# Patient Record
Sex: FEMALE | Race: WHITE | URBAN - METROPOLITAN AREA
[De-identification: names, ages, dates, MRNs, and addresses within clinical notes are randomized per-mention and may not be internally consistent; named-entity substitution may affect disease eponyms.]

---

## 2018-01-01 ENCOUNTER — TRANSFERRED RECORDS (OUTPATIENT)
Dept: HEALTH INFORMATION MANAGEMENT | Facility: CLINIC | Age: 78
End: 2018-01-01

## 2018-01-01 ENCOUNTER — MEDICAL CORRESPONDENCE (OUTPATIENT)
Dept: HEALTH INFORMATION MANAGEMENT | Facility: CLINIC | Age: 78
End: 2018-01-01

## 2018-01-01 ENCOUNTER — PATIENT OUTREACH (OUTPATIENT)
Dept: GASTROENTEROLOGY | Facility: CLINIC | Age: 78
End: 2018-01-01

## 2018-01-01 ENCOUNTER — CARE COORDINATION (OUTPATIENT)
Dept: GASTROENTEROLOGY | Facility: CLINIC | Age: 78
End: 2018-01-01

## 2018-01-01 ENCOUNTER — TELEPHONE (OUTPATIENT)
Dept: GASTROENTEROLOGY | Facility: CLINIC | Age: 78
End: 2018-01-01

## 2018-01-01 ENCOUNTER — TELEPHONE (OUTPATIENT)
Dept: ONCOLOGY | Facility: CLINIC | Age: 78
End: 2018-01-01

## 2018-01-01 DIAGNOSIS — C15.9 ESOPHAGEAL CANCER (H): Primary | ICD-10-CM

## 2018-01-01 PROCEDURE — 00000346 ZZHCL STATISTIC REVIEW OUTSIDE SLIDES TC 88321: Performed by: THORACIC SURGERY (CARDIOTHORACIC VASCULAR SURGERY)

## 2018-12-12 NOTE — PROGRESS NOTES
[facility]       Referred to: [facility]     Provider Requested: N/A     Referral Received: 12/12/2018     Records received: Coming from referring office.      Images received: Coming on a disc from referring office     Evaluation for: Esophageal stricture      Clinical History (per RN review):     MD review date: 12/21/2018- sent to Dr. Dee CASILLAS Decision for clinic consultation/Orders:   Referral to Dr. Islas       Referral updates/Patient contacted:   12/12/2018 1438: called referring office and confirmed they are not on Epic. She will send the images on disc, address given. States the hard copy records were faxed to the referral line. Waiting on those records from referral team.   - called Conchis and informed her referral has been received and will call her with plan once we receive records and have them reviewed.     Rosmery HEIN RN Care Coordinator  Dr. Agee, Dr. Price & Dr. Louie   [facility]  [phone]

## 2018-12-12 NOTE — TELEPHONE ENCOUNTER
[facility] Clinic Intake form:    Referring/Requesting provider and Health care System: Dr. Oxana Noel [facility]    Clinic contact - Name Cherie, Phone and Fax number: Phone [phone]    Requested provider (if specified): LOWELL, Dr. Lexa SHARMA, unable to swallow and losing a lot of weight    Has patient been evaluated in clinic or had a procedure [facility] provider in the last 5 years: No      Indication/Diagnosis for consultation: Esophageal Stricture    Is diagnosis on list of approved diagnosis: Yes  Has patient been evaluated by another Gastroenterologist?Unknown     Imaging completed:     CT scan     Yes   MRI       No    Procedures:     Upper Endoscopy/EGD   Yes     Endoscopic Ultrasound/EUS No    ERCP      No    Colonoscopy    No     Are images able/being pushed to our system?No    [facility] - Surgery Clinic  [facility]   Surgery Clinic: [facility]  Attn: Rosmery/Mary (Anuel Price and Jacy)  4th Floor, Mail code [id number]  [address] [place] [address]    Is patient aware of request for clinc consultation and ok to be contacted to schedule? Yes    Inform referring clinic of the following and provided fax number to: [facility]  - [phone]    READ TO REFERRING CLINIC:  Due to high demands for our services our clinic requires all records including imaging studies be mailed and faxed to our facility prior to scheduling. Records should be faxed within 24-72 hours of referral if possible and images should be pushed to our PACS system or received by mail within 72 hours of referral. Our office will NOT call to follow up or request records.  Our clinic will not be able to process, schedule or contact patient without records and Images for MD review process. Once records have been reviewed and recommendation/orders have been made the patient will be contacted to schedule. If records have not been  received within 2 weeks of initial referral call, referring office will be notified by letter and referral will be closed. If an appointment is unable to be offered at this time we will inform the referring office and patient via letter.

## 2018-12-20 NOTE — PROGRESS NOTES
Referral- per nursing review:   79 y/o female who has difficulty swallowing, particularly more solid foods and even sometimes even water is hard to get down. She feels like food gets stuck half way down.  Reports nausea, no appetite, bad breath, and heartburn. Occasionally takes over the counter acid blocking medicine.     EGD for dysphagia she was found to have a tumor at the distal esophagus that is consistent with that of invasive differentiated adenocarcinoma.      CT 12/10/2018- mid esophagus are distended, measuring a maximum diameter of 3.8 cm in mid portion secondary to a mass in the distal one-third of the esophagus, measuring 7 cm in length, the wall measuring up to 1.8 cm in thickness. The mass extend into the gastroesophageal junction. Several small lymph nodes measuring up to 6 mm. A hypodense nodule measuring 1.9cm in diameter.     *see records in Epic

## 2018-12-20 NOTE — PROGRESS NOTES
Received and scanned radiology report fron Angely in [facility].     Received images on disc and brought to film to be uploaded.     MADISON Lawson Dr., Dr. Price, & Dr. Louie  [facility]  [phone]

## 2018-12-27 NOTE — TELEPHONE ENCOUNTER
RECORDS STATUS - ALL OTHER DIAGNOSIS      RECORDS RECEIVED FROM: [facility]   DATE RECEIVED: 12/27/18   NOTES STATUS DETAILS   OFFICE NOTE from referring provider Complete [facility]   OFFICE NOTE from medical oncologist     DISCHARGE SUMMARY from hospital     DISCHARGE REPORT from the ER     OPERATIVE REPORT     MEDICATION LIST     CLINICAL TRIAL TREATMENTS TO DATE     LABS     PATHOLOGY REPORTS Complete [facility]   ANYTHING RELATED TO DIAGNOSIS Requested [facility]   GENONOMIC TESTING     TYPE:     IMAGING (NEED IMAGES & REPORT)     CT SCANS Requested [facility]   MRI     MAMMO     ULTRASOUND     PET

## 2018-12-31 NOTE — TELEPHONE ENCOUNTER
Per Patient's dtr's request,  completed and faxed [facility] referral for lodging dates 1/2/19 - 1/3/19. [facility] will contact Patient's dtr directly for confirmation of reservation.  will continue to provide support as needed.      Monisha Alberto (Martens), BRADY, MSW   - [facility]  Phone: [phone]  Pager: [phone]  12/31/2018

## 2019-01-01 ENCOUNTER — ANESTHESIA EVENT (OUTPATIENT)
Dept: SURGERY | Facility: CLINIC | Age: 79
End: 2019-01-01
Payer: COMMERCIAL

## 2019-01-01 ENCOUNTER — TELEPHONE (OUTPATIENT)
Dept: SURGERY | Facility: CLINIC | Age: 79
End: 2019-01-01

## 2019-01-01 ENCOUNTER — ANESTHESIA (OUTPATIENT)
Dept: SURGERY | Facility: CLINIC | Age: 79
End: 2019-01-01
Payer: COMMERCIAL

## 2019-01-01 ENCOUNTER — PRE VISIT (OUTPATIENT)
Dept: SURGERY | Facility: CLINIC | Age: 79
End: 2019-01-01

## 2019-01-01 ENCOUNTER — TRANSFERRED RECORDS (OUTPATIENT)
Dept: HEALTH INFORMATION MANAGEMENT | Facility: CLINIC | Age: 79
End: 2019-01-01

## 2019-01-01 ENCOUNTER — HOSPITAL ENCOUNTER (OUTPATIENT)
Facility: CLINIC | Age: 79
Discharge: HOME | End: 2019-01-11
Attending: THORACIC SURGERY (CARDIOTHORACIC VASCULAR SURGERY) | Admitting: THORACIC SURGERY (CARDIOTHORACIC VASCULAR SURGERY)
Payer: COMMERCIAL

## 2019-01-01 ENCOUNTER — HOSPITAL ENCOUNTER (OUTPATIENT)
Dept: PET IMAGING | Facility: CLINIC | Age: 79
Discharge: HOME | End: 2019-01-03
Attending: THORACIC SURGERY (CARDIOTHORACIC VASCULAR SURGERY) | Admitting: THORACIC SURGERY (CARDIOTHORACIC VASCULAR SURGERY)
Payer: COMMERCIAL

## 2019-01-01 ENCOUNTER — OFFICE VISIT (OUTPATIENT)
Dept: SURGERY | Facility: CLINIC | Age: 79
End: 2019-01-01
Attending: THORACIC SURGERY (CARDIOTHORACIC VASCULAR SURGERY)
Payer: COMMERCIAL

## 2019-01-01 VITALS
RESPIRATION RATE: 16 BRPM | OXYGEN SATURATION: 98 % | WEIGHT: 120.59 LBS | HEIGHT: 61 IN | DIASTOLIC BLOOD PRESSURE: 71 MMHG | TEMPERATURE: 98.3 F | SYSTOLIC BLOOD PRESSURE: 111 MMHG | HEART RATE: 87 BPM | BODY MASS INDEX: 22.77 KG/M2

## 2019-01-01 VITALS
HEART RATE: 95 BPM | DIASTOLIC BLOOD PRESSURE: 85 MMHG | SYSTOLIC BLOOD PRESSURE: 119 MMHG | WEIGHT: 122.2 LBS | TEMPERATURE: 98.1 F | BODY MASS INDEX: 23.07 KG/M2 | HEIGHT: 61 IN | OXYGEN SATURATION: 96 %

## 2019-01-01 DIAGNOSIS — C15.9 ESOPHAGEAL CANCER (H): ICD-10-CM

## 2019-01-01 DIAGNOSIS — C15.5 MALIGNANT NEOPLASM OF LOWER THIRD OF ESOPHAGUS (H): Primary | ICD-10-CM

## 2019-01-01 DIAGNOSIS — D49.0 ESOPHAGEAL NEOPLASM: Primary | ICD-10-CM

## 2019-01-01 LAB
COPATH REPORT: NORMAL
CREAT BLD-MCNC: 0.5 MG/DL (ref 0.52–1.04)
DLCOUNC-%PRED-PRE: 114 %
DLCOUNC-PRE: 20.93 ML/MIN/MMHG
DLCOUNC-PRED: 18.36 ML/MIN/MMHG
ERV-%PRED-PRE: 35 %
ERV-PRE: 0.22 L
ERV-PRED: 0.64 L
EXPTIME-PRE: 7.13 SEC
FEF2575-%PRED-PRE: 144 %
FEF2575-PRE: 2.01 L/SEC
FEF2575-PRED: 1.39 L/SEC
FEFMAX-%PRED-PRE: 145 %
FEFMAX-PRE: 6.37 L/SEC
FEFMAX-PRED: 4.39 L/SEC
FEV1-%PRED-PRE: 130 %
FEV1-PRE: 2.16 L
FEV1FEV6-PRE: 80 %
FEV1FEV6-PRED: 78 %
FEV1FVC-PRE: 79 %
FEV1FVC-PRED: 74 %
FEV1SVC-PRE: 81 %
FEV1SVC-PRED: 65 %
FIFMAX-PRE: 3.71 L/SEC
FRCPLETH-%PRED-PRE: 120 %
FRCPLETH-PRE: 3.1 L
FRCPLETH-PRED: 2.58 L
FVC-%PRED-PRE: 127 %
FVC-PRE: 2.72 L
FVC-PRED: 2.13 L
GFR SERPL CREATININE-BSD FRML MDRD: >90 ML/MIN/{1.73_M2}
GLUCOSE BLDC GLUCOMTR-MCNC: 86 MG/DL (ref 70–99)
GLUCOSE BLDC GLUCOMTR-MCNC: 90 MG/DL (ref 70–99)
HGB BLD-MCNC: 12.5 G/DL (ref 11.7–15.7)
IC-%PRED-PRE: 128 %
IC-PRE: 2.42 L
IC-PRED: 1.89 L
RVPLETH-%PRED-PRE: 136 %
RVPLETH-PRE: 2.87 L
RVPLETH-PRED: 2.11 L
TLCPLETH-%PRED-PRE: 122 %
TLCPLETH-PRE: 5.52 L
TLCPLETH-PRED: 4.52 L
VA-%PRED-PRE: 88 %
VA-PRE: 4.09 L
VC-%PRED-PRE: 104 %
VC-PRE: 2.65 L
VC-PRED: 2.53 L

## 2019-01-01 PROCEDURE — 27210794 ZZH OR GENERAL SUPPLY STERILE: Performed by: THORACIC SURGERY (CARDIOTHORACIC VASCULAR SURGERY)

## 2019-01-01 PROCEDURE — 74177 CT ABD & PELVIS W/CONTRAST: CPT

## 2019-01-01 PROCEDURE — 71260 CT THORAX DX C+: CPT

## 2019-01-01 PROCEDURE — 82962 GLUCOSE BLOOD TEST: CPT

## 2019-01-01 PROCEDURE — A9552 F18 FDG: HCPCS | Performed by: THORACIC SURGERY (CARDIOTHORACIC VASCULAR SURGERY)

## 2019-01-01 PROCEDURE — 88305 TISSUE EXAM BY PATHOLOGIST: CPT | Performed by: THORACIC SURGERY (CARDIOTHORACIC VASCULAR SURGERY)

## 2019-01-01 PROCEDURE — 25000128 H RX IP 250 OP 636: Performed by: ANESTHESIOLOGY

## 2019-01-01 PROCEDURE — G0463 HOSPITAL OUTPT CLINIC VISIT: HCPCS | Mod: ZF

## 2019-01-01 PROCEDURE — 82565 ASSAY OF CREATININE: CPT

## 2019-01-01 PROCEDURE — 37000008 ZZH ANESTHESIA TECHNICAL FEE, 1ST 30 MIN: Performed by: THORACIC SURGERY (CARDIOTHORACIC VASCULAR SURGERY)

## 2019-01-01 PROCEDURE — 71000012 ZZH RECOVERY PHASE 1 LEVEL 1 FIRST HR: Performed by: THORACIC SURGERY (CARDIOTHORACIC VASCULAR SURGERY)

## 2019-01-01 PROCEDURE — 88342 IMHCHEM/IMCYTCHM 1ST ANTB: CPT | Performed by: THORACIC SURGERY (CARDIOTHORACIC VASCULAR SURGERY)

## 2019-01-01 PROCEDURE — 00000155 ZZHCL STATISTIC H-CELL BLOCK W/STAIN: Performed by: THORACIC SURGERY (CARDIOTHORACIC VASCULAR SURGERY)

## 2019-01-01 PROCEDURE — 34300033 ZZH RX 343: Performed by: THORACIC SURGERY (CARDIOTHORACIC VASCULAR SURGERY)

## 2019-01-01 PROCEDURE — 25000128 H RX IP 250 OP 636: Performed by: NURSE ANESTHETIST, CERTIFIED REGISTERED

## 2019-01-01 PROCEDURE — 40000170 ZZH STATISTIC PRE-PROCEDURE ASSESSMENT II: Performed by: THORACIC SURGERY (CARDIOTHORACIC VASCULAR SURGERY)

## 2019-01-01 PROCEDURE — 25000132 ZZH RX MED GY IP 250 OP 250 PS 637: Performed by: ANESTHESIOLOGY

## 2019-01-01 PROCEDURE — 85018 HEMOGLOBIN: CPT | Performed by: ANESTHESIOLOGY

## 2019-01-01 PROCEDURE — 25000125 ZZHC RX 250: Performed by: NURSE ANESTHETIST, CERTIFIED REGISTERED

## 2019-01-01 PROCEDURE — 88172 CYTP DX EVAL FNA 1ST EA SITE: CPT | Performed by: THORACIC SURGERY (CARDIOTHORACIC VASCULAR SURGERY)

## 2019-01-01 PROCEDURE — 71000027 ZZH RECOVERY PHASE 2 EACH 15 MINS: Performed by: THORACIC SURGERY (CARDIOTHORACIC VASCULAR SURGERY)

## 2019-01-01 PROCEDURE — 36000064 ZZH SURGERY LEVEL 4 EA 15 ADDTL MIN - UMMC: Performed by: THORACIC SURGERY (CARDIOTHORACIC VASCULAR SURGERY)

## 2019-01-01 PROCEDURE — 88341 IMHCHEM/IMCYTCHM EA ADD ANTB: CPT | Performed by: THORACIC SURGERY (CARDIOTHORACIC VASCULAR SURGERY)

## 2019-01-01 PROCEDURE — 88173 CYTOPATH EVAL FNA REPORT: CPT | Performed by: THORACIC SURGERY (CARDIOTHORACIC VASCULAR SURGERY)

## 2019-01-01 PROCEDURE — 25000128 H RX IP 250 OP 636: Performed by: THORACIC SURGERY (CARDIOTHORACIC VASCULAR SURGERY)

## 2019-01-01 PROCEDURE — 36000062 ZZH SURGERY LEVEL 4 1ST 30 MIN - UMMC: Performed by: THORACIC SURGERY (CARDIOTHORACIC VASCULAR SURGERY)

## 2019-01-01 PROCEDURE — 37000009 ZZH ANESTHESIA TECHNICAL FEE, EACH ADDTL 15 MIN: Performed by: THORACIC SURGERY (CARDIOTHORACIC VASCULAR SURGERY)

## 2019-01-01 PROCEDURE — 36415 COLL VENOUS BLD VENIPUNCTURE: CPT | Performed by: ANESTHESIOLOGY

## 2019-01-01 RX ORDER — PROPOFOL 10 MG/ML
INJECTION, EMULSION INTRAVENOUS PRN
Status: DISCONTINUED | OUTPATIENT
Start: 2019-01-01 | End: 2019-01-01

## 2019-01-01 RX ORDER — IOPAMIDOL 755 MG/ML
45-150 INJECTION, SOLUTION INTRAVASCULAR ONCE
Status: COMPLETED | OUTPATIENT
Start: 2019-01-01 | End: 2019-01-01

## 2019-01-01 RX ORDER — ACETAMINOPHEN 325 MG/1
650 TABLET ORAL
Status: DISCONTINUED | OUTPATIENT
Start: 2019-01-01 | End: 2019-01-01 | Stop reason: HOSPADM

## 2019-01-01 RX ORDER — FENTANYL CITRATE 50 UG/ML
25-50 INJECTION, SOLUTION INTRAMUSCULAR; INTRAVENOUS
Status: DISCONTINUED | OUTPATIENT
Start: 2019-01-01 | End: 2019-01-01 | Stop reason: HOSPADM

## 2019-01-01 RX ORDER — ONDANSETRON 2 MG/ML
4 INJECTION INTRAMUSCULAR; INTRAVENOUS EVERY 30 MIN PRN
Status: DISCONTINUED | OUTPATIENT
Start: 2019-01-01 | End: 2019-01-01 | Stop reason: HOSPADM

## 2019-01-01 RX ORDER — FENTANYL CITRATE 50 UG/ML
INJECTION, SOLUTION INTRAMUSCULAR; INTRAVENOUS PRN
Status: DISCONTINUED | OUTPATIENT
Start: 2019-01-01 | End: 2019-01-01

## 2019-01-01 RX ORDER — SODIUM CHLORIDE, SODIUM LACTATE, POTASSIUM CHLORIDE, CALCIUM CHLORIDE 600; 310; 30; 20 MG/100ML; MG/100ML; MG/100ML; MG/100ML
INJECTION, SOLUTION INTRAVENOUS CONTINUOUS
Status: DISCONTINUED | OUTPATIENT
Start: 2019-01-01 | End: 2019-01-01 | Stop reason: HOSPADM

## 2019-01-01 RX ORDER — POLYETHYLENE GLYCOL 3350 17 G/17G
1 POWDER, FOR SOLUTION ORAL DAILY
COMMUNITY

## 2019-01-01 RX ORDER — GUAIFENESIN AND PSEUDOEPHEDRINE HCL 1200; 120 MG/1; MG/1
TABLET, EXTENDED RELEASE ORAL DAILY PRN
COMMUNITY

## 2019-01-01 RX ORDER — DEXAMETHASONE SODIUM PHOSPHATE 10 MG/ML
INJECTION, SOLUTION INTRAMUSCULAR; INTRAVENOUS PRN
Status: DISCONTINUED | OUTPATIENT
Start: 2019-01-01 | End: 2019-01-01

## 2019-01-01 RX ORDER — LIDOCAINE HYDROCHLORIDE 20 MG/ML
INJECTION, SOLUTION INFILTRATION; PERINEURAL PRN
Status: DISCONTINUED | OUTPATIENT
Start: 2019-01-01 | End: 2019-01-01

## 2019-01-01 RX ORDER — NALOXONE HYDROCHLORIDE 0.4 MG/ML
.1-.4 INJECTION, SOLUTION INTRAMUSCULAR; INTRAVENOUS; SUBCUTANEOUS
Status: DISCONTINUED | OUTPATIENT
Start: 2019-01-01 | End: 2019-01-01 | Stop reason: HOSPADM

## 2019-01-01 RX ORDER — PROPOFOL 10 MG/ML
INJECTION, EMULSION INTRAVENOUS CONTINUOUS PRN
Status: DISCONTINUED | OUTPATIENT
Start: 2019-01-01 | End: 2019-01-01

## 2019-01-01 RX ORDER — FLUTICASONE PROPIONATE 50 MCG
1 SPRAY, SUSPENSION (ML) NASAL DAILY PRN
COMMUNITY

## 2019-01-01 RX ORDER — ONDANSETRON 4 MG/1
4 TABLET, ORALLY DISINTEGRATING ORAL EVERY 30 MIN PRN
Status: DISCONTINUED | OUTPATIENT
Start: 2019-01-01 | End: 2019-01-01 | Stop reason: HOSPADM

## 2019-01-01 RX ORDER — EPHEDRINE SULFATE 50 MG/ML
INJECTION, SOLUTION INTRAMUSCULAR; INTRAVENOUS; SUBCUTANEOUS PRN
Status: DISCONTINUED | OUTPATIENT
Start: 2019-01-01 | End: 2019-01-01

## 2019-01-01 RX ADMIN — PHENYLEPHRINE HYDROCHLORIDE 100 MCG: 10 INJECTION, SOLUTION INTRAMUSCULAR; INTRAVENOUS; SUBCUTANEOUS at 09:14

## 2019-01-01 RX ADMIN — FLUDEOXYGLUCOSE F-18 10.2 MCI.: 500 INJECTION, SOLUTION INTRAVENOUS at 12:43

## 2019-01-01 RX ADMIN — ACETAMINOPHEN 650 MG: 325 SOLUTION ORAL at 08:04

## 2019-01-01 RX ADMIN — ONDANSETRON 4 MG: 2 INJECTION INTRAMUSCULAR; INTRAVENOUS at 10:48

## 2019-01-01 RX ADMIN — PHENYLEPHRINE HYDROCHLORIDE 100 MCG: 10 INJECTION, SOLUTION INTRAMUSCULAR; INTRAVENOUS; SUBCUTANEOUS at 09:32

## 2019-01-01 RX ADMIN — PHENYLEPHRINE HYDROCHLORIDE 100 MCG: 10 INJECTION, SOLUTION INTRAMUSCULAR; INTRAVENOUS; SUBCUTANEOUS at 09:17

## 2019-01-01 RX ADMIN — LIDOCAINE HYDROCHLORIDE 60 MG: 20 INJECTION, SOLUTION INFILTRATION; PERINEURAL at 09:07

## 2019-01-01 RX ADMIN — SUGAMMADEX 200 MG: 100 INJECTION, SOLUTION INTRAVENOUS at 09:41

## 2019-01-01 RX ADMIN — PHENYLEPHRINE HYDROCHLORIDE 100 MCG: 10 INJECTION, SOLUTION INTRAMUSCULAR; INTRAVENOUS; SUBCUTANEOUS at 09:18

## 2019-01-01 RX ADMIN — DEXAMETHASONE SODIUM PHOSPHATE 4 MG: 10 INJECTION, SOLUTION INTRAMUSCULAR; INTRAVENOUS at 09:13

## 2019-01-01 RX ADMIN — PROPOFOL 75 MCG/KG/MIN: 10 INJECTION, EMULSION INTRAVENOUS at 09:13

## 2019-01-01 RX ADMIN — SODIUM CHLORIDE, POTASSIUM CHLORIDE, SODIUM LACTATE AND CALCIUM CHLORIDE: 600; 310; 30; 20 INJECTION, SOLUTION INTRAVENOUS at 08:58

## 2019-01-01 RX ADMIN — Medication 10 MG: at 09:32

## 2019-01-01 RX ADMIN — ROCURONIUM BROMIDE 50 MG: 10 INJECTION INTRAVENOUS at 09:08

## 2019-01-01 RX ADMIN — FENTANYL CITRATE 50 MCG: 50 INJECTION, SOLUTION INTRAMUSCULAR; INTRAVENOUS at 09:07

## 2019-01-01 RX ADMIN — PROPOFOL 60 MG: 10 INJECTION, EMULSION INTRAVENOUS at 09:07

## 2019-01-01 RX ADMIN — IOPAMIDOL 74 ML: 755 INJECTION, SOLUTION INTRAVENOUS at 12:45

## 2019-01-01 RX ADMIN — Medication 5 MG: at 09:17

## 2019-01-01 SDOH — HEALTH STABILITY: MENTAL HEALTH: HOW OFTEN DO YOU HAVE A DRINK CONTAINING ALCOHOL?: NEVER

## 2019-01-01 SDOH — HEALTH STABILITY: MENTAL HEALTH: HOW OFTEN DO YOU HAVE 6 OR MORE DRINKS ON ONE OCCASION?: NEVER

## 2019-01-01 ASSESSMENT — MIFFLIN-ST. JEOR
SCORE: 953.93
SCORE: 946.44

## 2019-01-01 ASSESSMENT — PAIN SCALES - GENERAL: PAINLEVEL: SEVERE PAIN (6)

## 2019-01-01 ASSESSMENT — LIFESTYLE VARIABLES: TOBACCO_USE: 1

## 2019-01-01 NOTE — PROGRESS NOTES
THORACIC SURGERY - NEW PATIENT OFFICE VISIT          Ms. Patel is self-referred for the evaluation and treatment of an esophageal adenocarcinoma.     HPI  Ms. Conchis Patel is a 78 year old female initially presented to her PCP for evaluation of possible esophageal stricture and dysphagia. Symptoms began in late 11/2018 with progressively worsening difficulty swallowing solids to now include liquids. She has had 25 lb weight loss since Summer. She has been drinking 2 cans of Boost a day since her diagnosis.  CT 12/10 showed mid esophagus distension secondary to mass at the distal 1/3 of the esophagus extending into the GE junction. An EGD was performed 12/19 that revealed a tumor at the distal esophagus consistent with an invasive differentiated adenocarcinoma.      Previsit Tests   PET: pending    PFTs 1/2/19:  FEV1 2.16L, 130%.  DLCO 114%    EGD 12/19/18: tumor at distal esophagus     Pathology ([facility]) 11/28/1: 20 cm: Invasive moderately differentiated adenocarcinoma     CT 12/10/18: 7cm mass in the distal esophagus, proximal dilation, mass extends to the GE junction. Large hiatal hernia and small paraesophageal hernia     PET-CT 11/3/2019: innumerable lung nodules, some are FDG-avid; upper FDG-avid mediastinal lymphadenopathy - highly suspicious for metastatic disease            PMH  Arthritis  Cervical DJD  GERD  Chronic knee pain  Umbilical hernia    PSH:  Appendectomy 1970    ETOH quit drinking at age of 48, was heavy drinker in her 40s  TOB quit 40 years ago    Physical examination  BMI 23  Cachectic appearing, temporal muscle wasting    From a personal perspective, she lives in [place] alone.  Her daughter, Dave, lives in [place] in [place] and son lives in [place].      IMPRESSION (C15.5) Malignant neoplasm of lower third of esophagus (H)  (primary encounter diagnosis)    78 year old female with esophageal adenocarcinoma, clinical stage IV.     PLAN  I spent a total of 30 minutes  with Ms. Patel and Dave, more than 50% of which were spent in counseling, coordination of care, and face-to-face time. I reviewed the plan as follows:    1) Review her case at Tumor Board to determine whether we need tissue confirmation of metastatic disease  2) EBUS/EUS: we will schedule her for mediastinal LN sampling later in the week, and we can cancel if the Tumor Board decision is to just treat her as stage IV disease    They had all their questions answered and were in agreement with the plan.  I appreciate the opportunity to participate in the care of your patient and will keep you updated.  Sincerely,

## 2019-01-02 NOTE — LETTER
1/2/2019      RE: Conchis Patel  [address] [address]       THORACIC SURGERY - NEW PATIENT OFFICE VISIT          Ms. Patel is self-referred for the evaluation and treatment of an esophageal adenocarcinoma.     HPI  Ms. Conchis Patel is a 78 year old female initially presented to her PCP for evaluation of possible esophageal stricture and dysphagia. Symptoms began in late 11/2018 with progressively worsening difficulty swallowing solids to now include liquids. She has had 25 lb weight loss since Summer. She has been drinking 2 cans of Boost a day since her diagnosis.  CT 12/10 showed mid esophagus distension secondary to mass at the distal 1/3 of the esophagus extending into the GE junction. An EGD was performed 12/19 that revealed a tumor at the distal esophagus consistent with an invasive differentiated adenocarcinoma.      Previsit Tests   PET: pending    PFTs 1/2/19:  FEV1 2.16L, 130%.  DLCO 114%    EGD 12/19/18: tumor at distal esophagus     Pathology ([facility]) 11/28/1: 20 cm: Invasive moderately differentiated adenocarcinoma     CT 12/10/18: 7cm mass in the distal esophagus, proximal dilation, mass extends to the GE junction. Large hiatal hernia and small paraesophageal hernia     PET-CT 11/3/2019: innumerable lung nodules, some are FDG-avid; upper FDG-avid mediastinal lymphadenopathy - highly suspicious for metastatic disease            PMH  Arthritis  Cervical DJD  GERD  Chronic knee pain  Umbilical hernia    PSH:  Appendectomy 1970    ETOH quit drinking at age of 48, was heavy drinker in her 40s  TOB quit 40 years ago    Physical examination  BMI 23  Cachectic appearing, temporal muscle wasting    From a personal perspective, she lives in [place] alone.  Her daughter, Dave, lives in [place] in [place] and son lives in [place].      IMPRESSION (C15.5) Malignant neoplasm of lower third of esophagus (H)  (primary encounter diagnosis)    78 year old female with  esophageal adenocarcinoma, clinical stage IV.     PLAN  I spent a total of 30 minutes with Ms. Patel and Dave, more than 50% of which were spent in counseling, coordination of care, and face-to-face time. I reviewed the plan as follows:    1) Review her case at Tumor Board to determine whether we need tissue confirmation of metastatic disease  2) EBUS/EUS: we will schedule her for mediastinal LN sampling later in the week, and we can cancel if the Tumor Board decision is to just treat her as stage IV disease    They had all their questions answered and were in agreement with the plan.  I appreciate the opportunity to participate in the care of your patient and will keep you updated.  Sincerely,      Kendrick Islas MD

## 2019-01-02 NOTE — Clinical Note
1/2/2019      RE: Conchis Paetl  [address] [address]       THORACIC SURGERY - NEW PATIENT OFFICE VISIT        I saw Ms. Patel at Dr. Pierre s request in consultation for the evaluation and treatment of an esophageal adenocarcinoma.     HPI  Ms. Conchis Patel is a 78 year old female initially presented to her PCP for evaluation of possible esophageal stricture and dysphagia. Symptoms began in late 11/2018 with progressively worsening difficulty swallowing solids to now include liquids. She has had 25 lb weight loss since Summer. She has been drinking 2 cans of Boost a day since her diagnosis.  CT 12/10 showed mid esophagus distension secondary to mass at the distal 1/3 of the esophagus extending into the GE junction. An EGD was performed 12/19 that revealed a tumor at the distal esophagus consistent with an invasive differentiated adenocarcinoma.      Previsit Tests   PET: pending    PFTs 1/2/19:  FEV1 2.16L, 130%.  DLCO 114%    EGD 12/19/18: tumor at distal esophagus     Pathology ([facility]) 11/28/1: 20 cm: Invasive moderately differentiated adenocarcinoma     CT 12/10/18: 7cm mass in the distal esophagus, proximal dilation, mass extends to the GE junction. Large hiatal hernia and small paraesophageal hernia       PMH  Arthritis  Cervical DJD  GERD  Chronic knee pain  Umbilical hernia    PSH:  Appendectomy 1970    ETOH quit drinking at age of 48, was heavy drinker in her 40s  TOB quit 40 years ago    Physical examination  BMI 23  Cachectic appearing, temporal muscle wasting    From a personal perspective, she lives in [place] alone.  Her daughter, Dave, lives in [place] in [place] and son lives in [place].      IMPRESSION No diagnosis found.   78 year old female with esophageal adenocarcinoma.     PLAN  I spent a total of *** minutes with Ms. Patel and ***, more than 50% of which were spent in counseling, coordination of care, and face-to-face time. I reviewed the  plan as follows:  1) Procedure planned: ***. I reviewed the indications, risks, and benefits of the procedure with Ms. Patel and ***.     Necessary Preop Testing: PET scan (1/3/19)  Regional Anesthesia Plan: ***  Anticoagulation Plan: ***    They had all their questions answered and were in agreement with the plan.  I appreciate the opportunity to participate in the care of your patient and will keep you updated.  Sincerely,      THORACIC SURGERY - NEW PATIENT OFFICE VISIT          I saw Ms. Patel at Dr. Pierre s request in consultation for the evaluation and treatment of an esophageal adenocarcinoma.     HPI  Ms. Conchis Patel is a 78 year old female initially presented to her PCP for evaluation of possible esophageal stricture and dysphagia. Symptoms began in late 11/2018 with progressively worsening difficulty swallowing solids to now include liquids. She has had 25 lb weight loss since Summer. She has been drinking 2 cans of Boost a day since her diagnosis.  CT 12/10 showed mid esophagus distension secondary to mass at the distal 1/3 of the esophagus extending into the GE junction. An EGD was performed 12/19 that revealed a tumor at the distal esophagus consistent with an invasive differentiated adenocarcinoma.      Previsit Tests   PET: pending    PFTs 1/2/19:  FEV1 2.16L, 130%.  DLCO 114%    EGD 12/19/18: tumor at distal esophagus     Pathology ([facility]) 11/28/1: 20 cm: Invasive moderately differentiated adenocarcinoma     CT 12/10/18: 7cm mass in the distal esophagus, proximal dilation, mass extends to the GE junction. Large hiatal hernia and small paraesophageal hernia     PET-CT 11/3/2019: innumerable lung nodules, some are FDG-avid; upper FDG-avid mediastinal lymphadenopathy - very suggestive of metastatic disease          PMH  Arthritis  Cervical DJD  GERD  Chronic knee pain  Umbilical hernia    PSH:  Appendectomy 1970    ETOH quit drinking at age of 48, was heavy drinker in her 40s  TOB quit 40  years ago    Physical examination  BMI 23  Cachectic appearing, temporal muscle wasting    From a personal perspective, she lives in [place] alone.  Her daughter, Dave, lives in [place] in [place] and son lives in [place].      IMPRESSION No diagnosis found.     78 year old female with esophageal adenocarcinoma, clinical stage IV.     PLAN  I spent a total of 30 minutes with Ms. Pride, more than 50% of which were spent in counseling, coordination of care, and face-to-face time. I reviewed the plan as follows:    1) Review her case at Tumor Board to determine whether we need tissue confirmation of metastatic disease  2) EBUS/EUS: we will schedule her for mediastinal LN sampling later in the week, and we can cancel if the Tumor Board decision is to just treat her as stage IV disease    They had all their questions answered and were in agreement with the plan.  I appreciate the opportunity to participate in the care of your patient and will keep you updated.  Sincerely,      Kendrick Islas MD

## 2019-01-02 NOTE — NURSING NOTE
"Oncology Rooming Note    January 2, 2019 5:05 PM   Conchis Patel is a 78 year old female who presents for:    Chief Complaint   Patient presents with     Oncology Clinic Visit     NEW; Esophageal CA     Initial Vitals: /85   Pulse 95   Temp 98.1  F (36.7  C) (Oral)   Ht 1.537 m (5' 0.51\")   Wt 55.4 kg (122 lb 3.2 oz)   SpO2 96%   BMI 23.46 kg/m   Estimated body mass index is 23.46 kg/m  as calculated from the following:    Height as of this encounter: 1.537 m (5' 0.51\").    Weight as of this encounter: 55.4 kg (122 lb 3.2 oz). Body surface area is 1.54 meters squared.  Severe Pain (6) Comment: all over   No LMP recorded.  Allergies reviewed: Yes  Medications reviewed: Yes    Medications: Medication refills not needed today.  Pharmacy name entered into EPIC: Data Unavailable    Clinical concerns: No Concerns Islas was NOT notified.    8 minutes for nursing intake (face to face time)     Honey Grubbs MA              "

## 2019-01-04 NOTE — PROGRESS NOTES
I spoke to Conchis and her daughter, Dave.   We discussed the recommendation from our weekly conference today to have her scheduled with Dr. Islas for a bronchoscopy/EBUS and EGD/EUS to obtain tissue samples from the PET+ lymph nodes seen.   They are in agreement with this, will call their local clinic to arrange a pre-op H & P and our , [facility], will help arrange this soon.

## 2019-01-07 NOTE — TELEPHONE ENCOUNTER
She is scheduled for 1/11/19   I was only able to schedule for an early check in, so a [facility] referral would help her out.

## 2019-01-07 NOTE — TELEPHONE ENCOUNTER
Spoke with patient to schedule surgery with Dr Kendrick Islas    [facility]    Patient will have Pre-Op with PCP   Done 1/4/19    Patient is aware a / is needed day of surgery.     Surgery packet was sent via mail, patient has my direct contact information for any further questions.     Genesis Ricardo  Surgical [facility]-Op Coordinator  [phone]

## 2019-01-07 NOTE — TELEPHONE ENCOUNTER
Left message to schedule procedure with Dr Kendrick Islas    Details left with my direct contact number for scheduling.     Genesis Ricardo  [facility] Surgical Coordinator  [phone]

## 2019-01-11 NOTE — ANESTHESIA CARE TRANSFER NOTE
Patient: Conchis Patel    Procedure(s):  Flexible Bronchoscopy, Endobronchial Ultrasound    Diagnosis: Esophageal Cancer  Diagnosis Additional Information: No value filed.    Anesthesia Type:   No value filed.     Note:  Airway :Face Mask  Patient transferred to:PACU  Comments: Awake in PAR, tolerated anesthesia wellHandoff Report: Identifed the Patient, Identified the Reponsible Provider, Reviewed the pertinent medical history, Discussed the surgical course, Reviewed Intra-OP anesthesia mangement and issues during anesthesia, Set expectations for post-procedure period and Allowed opportunity for questions and acknowledgement of understanding      Vitals: (Last set prior to Anesthesia Care Transfer)    CRNA VITALS  1/11/2019 0916 - 1/11/2019 0955      1/11/2019             Resp Rate (observed):  9                Electronically Signed By: PIERRE Smith CRNA  January 11, 2019  9:55 AM

## 2019-01-11 NOTE — ANESTHESIA PREPROCEDURE EVALUATION
Anesthesia Pre-Procedure Evaluation    Patient: Conchis Patel   MRN:     [id number] Gender:   female   Age:    78 year old :      1940        Preoperative Diagnosis: Esophageal Cancer   Procedure(s):  Flexible Bronchoscopy, Endobronchial Ultrasound  With Transbronchial Biopsies, Esophagogastroduodenoscopy, Endoscopic Ultrasound With Transesophageal Biopsies     Past Medical History:   Diagnosis Date     Diverticulitis      DJD (degenerative joint disease)      Esophageal neoplasm      Gastroesophageal reflux disease       Past Surgical History:   Procedure Laterality Date     APPENDECTOMY       KNEE SURGERY      left meniscus tear repair          Anesthesia Evaluation     . Pt has had prior anesthetic.     No history of anesthetic complications          ROS/MED HX    ENT/Pulmonary:     (+)tobacco use, Past use , . .    Neurologic:     (+)migraines,     Cardiovascular:  - neg cardiovascular ROS       METS/Exercise Tolerance: Comment: Malnourished and deconditioned due to malignancy 3 - Able to walk 1-2 blocks without stopping   Hematologic:  - neg hematologic  ROS       Musculoskeletal:   (+) arthritis, , , -       GI/Hepatic:     (+) GERD Other GI/Hepatic esophageal cancer with dysphagia      Renal/Genitourinary:  - ROS Renal section negative       Endo:  - neg endo ROS       Psychiatric:  - neg psychiatric ROS       Infectious Disease:         Malignancy:   (+) Malignancy History of GI  GI CA Active status post,         Other:    (+) H/O Chronic Pain,                       PHYSICAL EXAM:   Mental Status/Neuro: A/A/O   Airway: Facies: Feasible  Mallampati: I  Mouth/Opening: Full  TM distance: > 6 cm  Neck ROM: Full   Respiratory: Auscultation: CTAB     Resp. Rate: Normal     Resp. Effort: Normal      CV: Rhythm: Regular  Rate: Age appropriate  Heart: Normal Sounds   Comments:      Dental:  Dentures: Upper; Lower                  Lab Results   Component Value Date    HGB 12.5 2019       Preop  "Vitals  BP Readings from Last 3 Encounters:   01/11/19 (!) 136/91   01/02/19 119/85    Pulse Readings from Last 3 Encounters:   01/11/19 86   01/02/19 95      Resp Readings from Last 3 Encounters:   No data found for Resp    SpO2 Readings from Last 3 Encounters:   01/11/19 100%   01/02/19 96%      Temp Readings from Last 1 Encounters:   01/11/19 36.6  C (97.9  F) (Oral)    Ht Readings from Last 1 Encounters:   01/11/19 1.537 m (5' 0.5\")      Wt Readings from Last 1 Encounters:   01/11/19 54.7 kg (120 lb 9.5 oz)    Estimated body mass index is 23.16 kg/m  as calculated from the following:    Height as of this encounter: 1.537 m (5' 0.5\").    Weight as of this encounter: 54.7 kg (120 lb 9.5 oz).     LDA:            Assessment:   ASA SCORE: 3    NPO Status: > 6 hours since completed Solid Foods   Documentation: H&P complete; Preop Testing complete; Consents complete   Proceeding: Proceed without further delay  Tobacco Use:  NO Active use of Tobacco/UNKNOWN Tobacco use status     Plan:   Anes. Type:  General   Pre-Induction: Acetaminophen PO   Induction:  IV (RSI)   Airway: Oral ETT   Access/Monitoring: PIV   Maintenance: Balanced   Emergence: Procedure Site   Logistics: Same Day Surgery     Postop Pain/Sedation Strategy:  Standard-Options: Opioids PRN     PONV Management:  Adult Risk Factors: Female, Non-Smoker, Postop Opioids  Prevention: Dexamethasone; Ondansetron     CONSENT: Direct conversation   Plan and risks discussed with: Patient   Blood Products: Consent Deferred (Minimal Blood Loss)       Comments for Plan/Consent:  78F without significant PMH, now with dysphagia found to have esophageal cancer.  ASA 3.  Plan: GETA, RSI  Risks of anesthesia discussed, including sore throat, PONV and risk of dental or lip trauma.                           Diana Leija MD  "

## 2019-01-11 NOTE — BRIEF OP NOTE
[facility], [place]    Brief Operative Note    Pre-operative diagnosis: Esophageal Cancer  Post-operative diagnosis * No post-op diagnosis entered *  Procedure: Procedure(s):  Flexible Bronchoscopy, Endobronchial Ultrasound  Surgeon: Surgeon(s) and Role:     * Kendrick Islas MD - Primary  Anesthesia: General   Estimated blood loss: 0 mL  Drains: None  Specimens:   ID Type Source Tests Collected by Time Destination   A : 2L Tissue Lymph Node FINE NEEDLE ASPIRATION Kendrick Islas MD 1/11/2019  9:25 AM      Findings:   None.  Complications: None.  Implants: None.

## 2019-01-11 NOTE — OR NURSING
Dr. Pereyra paged for discharge instructions in the patient's paperwork.    Dr. Pereyra verbalized that pt does not need discharge instructions; she can call the clinic is she has any questions or concerns.

## 2019-01-11 NOTE — ANESTHESIA POSTPROCEDURE EVALUATION
Anesthesia POST Procedure Evaluation    Patient: Conchis Patel   MRN:     [id number] Gender:   female   Age:    78 year old :      1940        Preoperative Diagnosis: Esophageal Cancer   Procedure(s):  Flexible Bronchoscopy, Endobronchial Ultrasound   Postop Comments: No value filed.       Anesthesia Type:  General    Reportable Event: NO     PAIN: Uncomplicated   Sign Out status: Comfortable, Well controlled pain     PONV: No PONV   Sign Out status:  No Nausea or Vomiting     Neuro/Psych: Uneventful perioperative course   Sign Out Status: Preoperative baseline; Age appropriate mentation     Airway/Resp.: Uneventful perioperative course   Sign Out Status: Non labored breathing, age appropriate RR; Resp. Status within EXPECTED Parameters     CV: Uneventful perioperative course   Sign Out status: Appropriate BP and perfusion indices; Appropriate HR/Rhythm     Disposition:   Sign Out in:  PACU  Disposition:  Phase II; Home  Recovery Course: Uneventful  Follow-Up: Not required           Last Anesthesia Record Vitals:  [facility] VITALS  2019 0916 - 2019 1016      2019             Resp Rate (observed):  9          Last PACU/Preop Vitals:  Vitals:    19 0702 19 0950 19 1000   BP: (!) 136/91 108/75 101/64   Pulse: 86  84   Resp:  16 16   Temp: 36.6  C (97.9  F) 36.6  C (97.8  F)    SpO2: 100% 99% 98%         Electronically Signed By: Diana Leija MD, 2019, 10:24 AM

## 2019-01-16 NOTE — OP NOTE
Procedure Date: 2019      SURGEON:  Dori Islas MD (present for the entire procedure).      RESIDENT SURGEON:  Angelique Donaldson MD      ANESTHESIA:  General.      ESTIMATED BLOOD LOSS:  Zero.      FINDINGS:  We easily identified an enlarged hypoechoic lymph node with sharp borders in 2L and sampled it several times obtaining material consistent with metastatic cancer.        DESCRIPTION OF PROCEDURE:  The patient in supine position, general anesthesia, I performed a bronchoscopy and identified no endobronchial lesions.  With endobronchial ultrasound identified a 2 cm left upper paratracheal lymph node (2L) with sharp borders, hypoechoic and I took several samples with the above-mentioned results.      The patient tolerated the procedure well.         DORI ISLAS MD             D: 01/15/2019   T: 2019   MT: RENÉE      Name:     PHYLLIS JERONIMO   MRN:      [id number]        Account:        [id number]   :      1940           Procedure Date: 2019      Document: [id number]       cc: [facility] Surgery Billing

## 2019-01-17 NOTE — TELEPHONE ENCOUNTER
Call to Conchis with final pathology from her biopsy with Dr. Islas. 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 We would like to get her in with Medical Oncology to start treatment. The cancer center closest to her is at [facility] in [place].     Call placed to the cancer clinic at [facility]. Spoke with Nargis who gave me the fax number so I can begin to send over pertinent records. Dr. Islas will need to call Dr. Marie on the MD to MD pager so he can talk with him prior to them scheduling her. The clinic will contact Conchis directly to schedule    Dr. Islas will call him (pager line is [phone]).     I updated Conchis as to the above. She will await a call from [facility]'s cancer clinic for an appointment.

## 2022-02-18 ENCOUNTER — PREPPED CHART (OUTPATIENT)
Dept: URBAN - METROPOLITAN AREA CLINIC 50 | Facility: CLINIC | Age: 82
End: 2022-02-18

## 2022-02-18 PROBLEM — E11.3311 MODERATE NONPROLIFERATIVE DIABETIC RETINOPATHY: Noted: 2022-02-18

## 2022-02-18 PROBLEM — Z79.4 MODERATE NONPROLIFERATIVE DIABETIC RETINOPATHY: Noted: 2022-02-18

## 2022-02-18 PROBLEM — E11.3313 MODERATE NONPROLIFERATIVE DIABETIC RETINOPATHY: Noted: 2022-02-18

## 2022-02-18 PROBLEM — Z79.4 DIABETIC MACULAR EDEMA: Noted: 2022-02-18

## 2022-02-18 PROBLEM — H43.813 POSTERIOR VITREOUS DETACHMENT: Noted: 2022-02-18

## 2022-02-18 PROBLEM — E11.3392 MODERATE NONPROLIFERATIVE DIABETIC RETINOPATHY: Noted: 2022-02-18

## 2022-02-18 PROBLEM — Z79.4 CSME: Noted: 2022-02-18

## 2022-02-18 PROBLEM — E11.3313 DIABETIC MACULAR EDEMA: Noted: 2022-02-18

## 2022-02-18 PROBLEM — E11.3311 CSME: Noted: 2022-02-18

## 2022-02-18 PROBLEM — E11.3393 MODERATE NONPROLIFERATIVE DIABETIC RETINOPATHY: Noted: 2022-02-18

## 2022-02-18 PROBLEM — E11.3311 DIABETIC MACULAR EDEMA: Noted: 2022-02-18

## 2022-03-30 ASSESSMENT — TONOMETRY
OD_IOP_MMHG: 14
OS_IOP_MMHG: 15

## 2022-03-30 ASSESSMENT — VISUAL ACUITY
OS_CC: 20/20-1
OD_CC: 20/20

## 2022-04-22 ENCOUNTER — FOLLOW UP (OUTPATIENT)
Dept: URBAN - METROPOLITAN AREA CLINIC 50 | Facility: CLINIC | Age: 82
End: 2022-04-22

## 2022-04-22 DIAGNOSIS — E11.3311: ICD-10-CM

## 2022-04-22 DIAGNOSIS — H43.813: ICD-10-CM

## 2022-04-22 DIAGNOSIS — E11.3512: ICD-10-CM

## 2022-04-22 PROCEDURE — 92134 CPTRZ OPH DX IMG PST SGM RTA: CPT

## 2022-04-22 PROCEDURE — 99214 OFFICE O/P EST MOD 30 MIN: CPT

## 2022-04-22 PROCEDURE — 92202 OPSCPY EXTND ON/MAC DRAW: CPT

## 2022-04-22 ASSESSMENT — VISUAL ACUITY
OS_CC: 20/20
OD_PH: 20/20-2
OD_CC: 20/25

## 2022-04-22 ASSESSMENT — TONOMETRY
OS_IOP_MMHG: 16
OD_IOP_MMHG: 11

## 2022-07-22 ENCOUNTER — FOLLOW UP (OUTPATIENT)
Dept: URBAN - METROPOLITAN AREA CLINIC 50 | Facility: CLINIC | Age: 82
End: 2022-07-22

## 2022-07-22 DIAGNOSIS — H43.813: ICD-10-CM

## 2022-07-22 DIAGNOSIS — E11.3313: ICD-10-CM

## 2022-07-22 DIAGNOSIS — Z79.4: ICD-10-CM

## 2022-07-22 PROCEDURE — PFS EYLEA PFS

## 2022-07-22 PROCEDURE — 92134 CPTRZ OPH DX IMG PST SGM RTA: CPT

## 2022-07-22 PROCEDURE — 67028 INJECTION EYE DRUG: CPT

## 2022-07-22 PROCEDURE — 92014 COMPRE OPH EXAM EST PT 1/>: CPT | Mod: NC

## 2022-07-22 ASSESSMENT — VISUAL ACUITY
OD_CC: 20/20
OS_CC: 20/25-3

## 2022-07-22 ASSESSMENT — TONOMETRY
OS_IOP_MMHG: 16
OD_IOP_MMHG: 17

## 2022-09-02 ENCOUNTER — FOLLOW UP (OUTPATIENT)
Dept: URBAN - METROPOLITAN AREA CLINIC 50 | Facility: CLINIC | Age: 82
End: 2022-09-02

## 2022-09-02 DIAGNOSIS — E11.3313: ICD-10-CM

## 2022-09-02 DIAGNOSIS — H43.813: ICD-10-CM

## 2022-09-02 DIAGNOSIS — Z79.4: ICD-10-CM

## 2022-09-02 PROCEDURE — 92014 COMPRE OPH EXAM EST PT 1/>: CPT

## 2022-09-02 PROCEDURE — 92202 OPSCPY EXTND ON/MAC DRAW: CPT

## 2022-09-02 PROCEDURE — 92134 CPTRZ OPH DX IMG PST SGM RTA: CPT

## 2022-09-02 ASSESSMENT — VISUAL ACUITY
OS_CC: 20/20
OD_CC: 20/20-2

## 2022-09-02 ASSESSMENT — TONOMETRY
OD_IOP_MMHG: 13
OS_IOP_MMHG: 14

## 2022-10-11 ENCOUNTER — FOLLOW UP (OUTPATIENT)
Dept: URBAN - METROPOLITAN AREA CLINIC 50 | Facility: CLINIC | Age: 82
End: 2022-10-11

## 2022-10-11 DIAGNOSIS — E11.3313: ICD-10-CM

## 2022-10-11 DIAGNOSIS — Z79.4: ICD-10-CM

## 2022-10-11 DIAGNOSIS — H43.813: ICD-10-CM

## 2022-10-11 PROCEDURE — 92202 OPSCPY EXTND ON/MAC DRAW: CPT | Mod: NC

## 2022-10-11 PROCEDURE — 67028 INJECTION EYE DRUG: CPT

## 2022-10-11 PROCEDURE — PFS EYLEA PFS

## 2022-10-11 PROCEDURE — 92134 CPTRZ OPH DX IMG PST SGM RTA: CPT

## 2022-10-11 PROCEDURE — 92014 COMPRE OPH EXAM EST PT 1/>: CPT | Mod: 25

## 2022-10-11 ASSESSMENT — TONOMETRY
OD_IOP_MMHG: 16
OS_IOP_MMHG: 19

## 2022-10-11 ASSESSMENT — VISUAL ACUITY
OD_CC: 20/20
OS_CC: 20/25-1

## 2022-12-02 ENCOUNTER — FOLLOW UP (OUTPATIENT)
Dept: URBAN - METROPOLITAN AREA CLINIC 50 | Facility: CLINIC | Age: 82
End: 2022-12-02

## 2022-12-02 DIAGNOSIS — H43.813: ICD-10-CM

## 2022-12-02 DIAGNOSIS — E11.3313: ICD-10-CM

## 2022-12-02 DIAGNOSIS — Z79.4: ICD-10-CM

## 2022-12-02 PROCEDURE — 92202 OPSCPY EXTND ON/MAC DRAW: CPT

## 2022-12-02 PROCEDURE — 92134 CPTRZ OPH DX IMG PST SGM RTA: CPT

## 2022-12-02 PROCEDURE — 92014 COMPRE OPH EXAM EST PT 1/>: CPT

## 2022-12-02 ASSESSMENT — TONOMETRY
OS_IOP_MMHG: 11
OD_IOP_MMHG: 11

## 2022-12-02 ASSESSMENT — VISUAL ACUITY
OS_CC: 20/25+1
OD_CC: 20/20-1

## 2023-03-10 ENCOUNTER — FOLLOW UP (OUTPATIENT)
Dept: URBAN - METROPOLITAN AREA CLINIC 50 | Facility: CLINIC | Age: 83
End: 2023-03-10

## 2023-03-10 DIAGNOSIS — H43.813: ICD-10-CM

## 2023-03-10 DIAGNOSIS — E11.3311: ICD-10-CM

## 2023-03-10 DIAGNOSIS — E11.3392: ICD-10-CM

## 2023-03-10 PROCEDURE — PFS EYLEA PFS

## 2023-03-10 PROCEDURE — 92014 COMPRE OPH EXAM EST PT 1/>: CPT | Mod: 25

## 2023-03-10 PROCEDURE — 92134 CPTRZ OPH DX IMG PST SGM RTA: CPT

## 2023-03-10 PROCEDURE — 92202 OPSCPY EXTND ON/MAC DRAW: CPT | Mod: 59

## 2023-03-10 PROCEDURE — 67028 INJECTION EYE DRUG: CPT

## 2023-03-10 ASSESSMENT — TONOMETRY
OD_IOP_MMHG: 12
OS_IOP_MMHG: 13

## 2023-03-10 ASSESSMENT — VISUAL ACUITY
OD_CC: 20/20-2
OS_CC: 20/25-1

## 2023-04-07 ENCOUNTER — FOLLOW UP (OUTPATIENT)
Dept: URBAN - METROPOLITAN AREA CLINIC 50 | Facility: CLINIC | Age: 83
End: 2023-04-07

## 2023-04-07 DIAGNOSIS — Z79.4: ICD-10-CM

## 2023-04-07 DIAGNOSIS — E11.3311: ICD-10-CM

## 2023-04-07 DIAGNOSIS — H43.813: ICD-10-CM

## 2023-04-07 DIAGNOSIS — E11.3392: ICD-10-CM

## 2023-04-07 PROCEDURE — 92014 COMPRE OPH EXAM EST PT 1/>: CPT

## 2023-04-07 PROCEDURE — 92202 OPSCPY EXTND ON/MAC DRAW: CPT

## 2023-04-07 ASSESSMENT — VISUAL ACUITY
OD_CC: 20/20-1
OS_CC: 20/25
OS_PH: 20/25+2

## 2023-04-07 ASSESSMENT — TONOMETRY
OS_IOP_MMHG: 11
OD_IOP_MMHG: 12

## 2023-05-23 ENCOUNTER — FOLLOW UP (OUTPATIENT)
Dept: URBAN - METROPOLITAN AREA CLINIC 50 | Facility: CLINIC | Age: 83
End: 2023-05-23

## 2023-05-23 DIAGNOSIS — H43.813: ICD-10-CM

## 2023-05-23 DIAGNOSIS — E11.3393: ICD-10-CM

## 2023-05-23 DIAGNOSIS — Z79.4: ICD-10-CM

## 2023-05-23 PROCEDURE — 92202 OPSCPY EXTND ON/MAC DRAW: CPT

## 2023-05-23 PROCEDURE — 92134 CPTRZ OPH DX IMG PST SGM RTA: CPT

## 2023-05-23 PROCEDURE — 92014 COMPRE OPH EXAM EST PT 1/>: CPT

## 2023-05-23 ASSESSMENT — TONOMETRY
OS_IOP_MMHG: 12
OD_IOP_MMHG: 14

## 2023-05-23 ASSESSMENT — VISUAL ACUITY
OS_CC: 20/25-1
OD_CC: 20/25+1

## 2023-09-19 ENCOUNTER — FOLLOW UP (OUTPATIENT)
Dept: URBAN - METROPOLITAN AREA CLINIC 50 | Facility: CLINIC | Age: 83
End: 2023-09-19

## 2023-09-19 DIAGNOSIS — H43.813: ICD-10-CM

## 2023-09-19 DIAGNOSIS — E11.3313: ICD-10-CM

## 2023-09-19 DIAGNOSIS — Z79.4: ICD-10-CM

## 2023-09-19 PROCEDURE — 92202 OPSCPY EXTND ON/MAC DRAW: CPT | Mod: NC

## 2023-09-19 PROCEDURE — 67028 INJECTION EYE DRUG: CPT | Mod: 50

## 2023-09-19 PROCEDURE — PFS EYLEA PFS: Mod: JZ

## 2023-09-19 PROCEDURE — 92134 CPTRZ OPH DX IMG PST SGM RTA: CPT

## 2023-09-19 PROCEDURE — 92014 COMPRE OPH EXAM EST PT 1/>: CPT | Mod: 25

## 2023-09-19 ASSESSMENT — VISUAL ACUITY
OS_CC: 20/20
OD_CC: 20/20-1

## 2023-09-19 ASSESSMENT — TONOMETRY
OD_IOP_MMHG: 15
OS_IOP_MMHG: 15

## 2023-10-20 ENCOUNTER — FOLLOW UP (OUTPATIENT)
Dept: URBAN - METROPOLITAN AREA CLINIC 50 | Facility: CLINIC | Age: 83
End: 2023-10-20

## 2023-10-20 DIAGNOSIS — E11.3392: ICD-10-CM

## 2023-10-20 DIAGNOSIS — E11.3311: ICD-10-CM

## 2023-10-20 DIAGNOSIS — H43.813: ICD-10-CM

## 2023-10-20 DIAGNOSIS — Z79.4: ICD-10-CM

## 2023-10-20 PROCEDURE — 67028 INJECTION EYE DRUG: CPT

## 2023-10-20 PROCEDURE — 92202 OPSCPY EXTND ON/MAC DRAW: CPT | Mod: 59

## 2023-10-20 PROCEDURE — PFS EYLEA PFS: Mod: JZ

## 2023-10-20 PROCEDURE — 92014 COMPRE OPH EXAM EST PT 1/>: CPT | Mod: 25

## 2023-10-20 PROCEDURE — 92134 CPTRZ OPH DX IMG PST SGM RTA: CPT

## 2023-10-20 ASSESSMENT — VISUAL ACUITY
OD_CC: 20/20
OS_CC: 20/20

## 2023-10-20 ASSESSMENT — TONOMETRY
OD_IOP_MMHG: 17
OS_IOP_MMHG: 13

## 2023-11-17 ENCOUNTER — FOLLOW UP (OUTPATIENT)
Dept: URBAN - METROPOLITAN AREA CLINIC 50 | Facility: CLINIC | Age: 83
End: 2023-11-17

## 2023-11-17 DIAGNOSIS — H43.813: ICD-10-CM

## 2023-11-17 DIAGNOSIS — E11.3313: ICD-10-CM

## 2023-11-17 DIAGNOSIS — Z79.4: ICD-10-CM

## 2023-11-17 PROCEDURE — 92202 OPSCPY EXTND ON/MAC DRAW: CPT | Mod: NC

## 2023-11-17 PROCEDURE — 92134 CPTRZ OPH DX IMG PST SGM RTA: CPT

## 2023-11-17 PROCEDURE — PFS EYLEA PFS: Mod: JZ

## 2023-11-17 PROCEDURE — 92014 COMPRE OPH EXAM EST PT 1/>: CPT | Mod: 25

## 2023-11-17 PROCEDURE — 67028 INJECTION EYE DRUG: CPT

## 2023-11-17 ASSESSMENT — TONOMETRY
OS_IOP_MMHG: 14
OD_IOP_MMHG: 16

## 2023-11-17 ASSESSMENT — VISUAL ACUITY
OS_CC: 20/25-1
OD_CC: 20/20

## 2023-12-15 ENCOUNTER — FOLLOW UP (OUTPATIENT)
Dept: URBAN - METROPOLITAN AREA CLINIC 50 | Facility: CLINIC | Age: 83
End: 2023-12-15

## 2023-12-15 DIAGNOSIS — Z79.4: ICD-10-CM

## 2023-12-15 DIAGNOSIS — E11.3313: ICD-10-CM

## 2023-12-15 DIAGNOSIS — H43.813: ICD-10-CM

## 2023-12-15 PROCEDURE — 92134 CPTRZ OPH DX IMG PST SGM RTA: CPT

## 2023-12-15 PROCEDURE — 92014 COMPRE OPH EXAM EST PT 1/>: CPT | Mod: 25

## 2023-12-15 PROCEDURE — 92202 OPSCPY EXTND ON/MAC DRAW: CPT | Mod: NC

## 2023-12-15 PROCEDURE — PFS EYLEA PFS: Mod: JZ

## 2023-12-15 PROCEDURE — 67028 INJECTION EYE DRUG: CPT | Mod: 50

## 2023-12-15 ASSESSMENT — TONOMETRY
OS_IOP_MMHG: 14
OD_IOP_MMHG: 14

## 2023-12-15 ASSESSMENT — VISUAL ACUITY
OD_CC: 20/20-1
OS_CC: 20/20-1

## 2024-01-12 ENCOUNTER — FOLLOW UP (OUTPATIENT)
Dept: URBAN - METROPOLITAN AREA CLINIC 50 | Facility: CLINIC | Age: 84
End: 2024-01-12

## 2024-01-12 DIAGNOSIS — Z79.4: ICD-10-CM

## 2024-01-12 DIAGNOSIS — E11.3313: ICD-10-CM

## 2024-01-12 DIAGNOSIS — H43.813: ICD-10-CM

## 2024-01-12 PROCEDURE — 92134 CPTRZ OPH DX IMG PST SGM RTA: CPT

## 2024-01-12 PROCEDURE — 92014 COMPRE OPH EXAM EST PT 1/>: CPT | Mod: 25

## 2024-01-12 PROCEDURE — 92202 OPSCPY EXTND ON/MAC DRAW: CPT | Mod: NC

## 2024-01-12 PROCEDURE — 67028 INJECTION EYE DRUG: CPT | Mod: 50

## 2024-01-12 PROCEDURE — PFS EYLEA PFS: Mod: JZ

## 2024-01-12 ASSESSMENT — TONOMETRY
OD_IOP_MMHG: 12
OS_IOP_MMHG: 14

## 2024-01-12 ASSESSMENT — VISUAL ACUITY
OD_CC: 20/20
OS_CC: 20/25

## 2024-02-09 ENCOUNTER — FOLLOW UP (OUTPATIENT)
Dept: URBAN - METROPOLITAN AREA CLINIC 50 | Facility: CLINIC | Age: 84
End: 2024-02-09

## 2024-02-09 DIAGNOSIS — E11.3313: ICD-10-CM

## 2024-02-09 DIAGNOSIS — H04.123: ICD-10-CM

## 2024-02-09 DIAGNOSIS — H43.813: ICD-10-CM

## 2024-02-09 DIAGNOSIS — Z79.4: ICD-10-CM

## 2024-02-09 PROCEDURE — 92014 COMPRE OPH EXAM EST PT 1/>: CPT

## 2024-02-09 PROCEDURE — 92202 OPSCPY EXTND ON/MAC DRAW: CPT

## 2024-02-09 PROCEDURE — 92134 CPTRZ OPH DX IMG PST SGM RTA: CPT

## 2024-02-09 ASSESSMENT — TONOMETRY
OS_IOP_MMHG: 20
OD_IOP_MMHG: 16

## 2024-02-09 ASSESSMENT — VISUAL ACUITY
OD_CC: 20/20
OS_CC: 20/30

## 2024-03-08 ENCOUNTER — FOLLOW UP (OUTPATIENT)
Dept: URBAN - METROPOLITAN AREA CLINIC 50 | Facility: CLINIC | Age: 84
End: 2024-03-08

## 2024-03-08 DIAGNOSIS — H04.123: ICD-10-CM

## 2024-03-08 DIAGNOSIS — E11.3313: ICD-10-CM

## 2024-03-08 DIAGNOSIS — Z79.4: ICD-10-CM

## 2024-03-08 DIAGNOSIS — H43.813: ICD-10-CM

## 2024-03-08 PROCEDURE — 92134 CPTRZ OPH DX IMG PST SGM RTA: CPT

## 2024-03-08 PROCEDURE — 92014 COMPRE OPH EXAM EST PT 1/>: CPT

## 2024-03-08 PROCEDURE — 92202 OPSCPY EXTND ON/MAC DRAW: CPT

## 2024-03-08 ASSESSMENT — TONOMETRY
OS_IOP_MMHG: 15
OD_IOP_MMHG: 16

## 2024-03-08 ASSESSMENT — VISUAL ACUITY
OD_CC: 20/20-2
OS_CC: 20/30

## 2024-05-03 ENCOUNTER — FOLLOW UP (OUTPATIENT)
Dept: URBAN - METROPOLITAN AREA CLINIC 50 | Facility: CLINIC | Age: 84
End: 2024-05-03

## 2024-05-03 DIAGNOSIS — E11.3313: ICD-10-CM

## 2024-05-03 DIAGNOSIS — Z79.4: ICD-10-CM

## 2024-05-03 DIAGNOSIS — H04.123: ICD-10-CM

## 2024-05-03 DIAGNOSIS — H43.813: ICD-10-CM

## 2024-05-03 PROCEDURE — PFS EYLEA PFS: Mod: JZ

## 2024-05-03 PROCEDURE — 92014 COMPRE OPH EXAM EST PT 1/>: CPT | Mod: 25

## 2024-05-03 PROCEDURE — 92202 OPSCPY EXTND ON/MAC DRAW: CPT | Mod: 59

## 2024-05-03 PROCEDURE — 67028 INJECTION EYE DRUG: CPT

## 2024-05-03 PROCEDURE — 92134 CPTRZ OPH DX IMG PST SGM RTA: CPT

## 2024-05-03 ASSESSMENT — TONOMETRY
OD_IOP_MMHG: 18
OS_IOP_MMHG: 18

## 2024-05-03 ASSESSMENT — VISUAL ACUITY
OS_CC: 20/25-2
OD_CC: 20/20-1

## 2024-05-31 ENCOUNTER — FOLLOW UP (OUTPATIENT)
Dept: URBAN - METROPOLITAN AREA CLINIC 50 | Facility: CLINIC | Age: 84
End: 2024-05-31

## 2024-05-31 DIAGNOSIS — E11.3313: ICD-10-CM

## 2024-05-31 DIAGNOSIS — H43.813: ICD-10-CM

## 2024-05-31 PROCEDURE — 92014 COMPRE OPH EXAM EST PT 1/>: CPT | Mod: 25

## 2024-05-31 PROCEDURE — 92202 OPSCPY EXTND ON/MAC DRAW: CPT | Mod: 59

## 2024-05-31 PROCEDURE — 92134 CPTRZ OPH DX IMG PST SGM RTA: CPT

## 2024-05-31 PROCEDURE — 67028 INJECTION EYE DRUG: CPT

## 2024-05-31 ASSESSMENT — VISUAL ACUITY
OD_CC: 20/20+1
OS_CC: 20/25-1

## 2024-05-31 ASSESSMENT — TONOMETRY
OS_IOP_MMHG: 14
OD_IOP_MMHG: 14

## 2024-06-28 ENCOUNTER — FOLLOW UP (OUTPATIENT)
Dept: URBAN - METROPOLITAN AREA CLINIC 50 | Facility: CLINIC | Age: 84
End: 2024-06-28

## 2024-06-28 DIAGNOSIS — E11.3313: ICD-10-CM

## 2024-06-28 DIAGNOSIS — H43.813: ICD-10-CM

## 2024-06-28 PROCEDURE — 92134 CPTRZ OPH DX IMG PST SGM RTA: CPT

## 2024-06-28 PROCEDURE — 92202 OPSCPY EXTND ON/MAC DRAW: CPT | Mod: 59

## 2024-06-28 PROCEDURE — 92014 COMPRE OPH EXAM EST PT 1/>: CPT | Mod: 25

## 2024-06-28 PROCEDURE — 67028 INJECTION EYE DRUG: CPT

## 2024-06-28 ASSESSMENT — TONOMETRY
OD_IOP_MMHG: 13
OS_IOP_MMHG: 15

## 2024-06-28 ASSESSMENT — VISUAL ACUITY
OS_CC: 20/30-2
OD_CC: 20/20-1

## 2024-09-13 ENCOUNTER — FOLLOW UP (OUTPATIENT)
Dept: URBAN - METROPOLITAN AREA CLINIC 50 | Facility: CLINIC | Age: 84
End: 2024-09-13

## 2024-09-13 DIAGNOSIS — Z79.4: ICD-10-CM

## 2024-09-13 DIAGNOSIS — H43.813: ICD-10-CM

## 2024-09-13 DIAGNOSIS — E11.3313: ICD-10-CM

## 2024-09-13 PROCEDURE — 67028 INJECTION EYE DRUG: CPT

## 2024-09-13 PROCEDURE — 92134 CPTRZ OPH DX IMG PST SGM RTA: CPT

## 2024-09-13 PROCEDURE — 92202 OPSCPY EXTND ON/MAC DRAW: CPT | Mod: 59

## 2024-09-13 PROCEDURE — 92014 COMPRE OPH EXAM EST PT 1/>: CPT | Mod: 25

## 2024-09-13 ASSESSMENT — VISUAL ACUITY
OS_CC: 20/25-2
OD_CC: 20/30+1

## 2024-09-13 ASSESSMENT — TONOMETRY
OD_IOP_MMHG: 14
OS_IOP_MMHG: 15

## 2024-11-08 ENCOUNTER — FOLLOW UP (OUTPATIENT)
Dept: URBAN - METROPOLITAN AREA CLINIC 50 | Facility: CLINIC | Age: 84
End: 2024-11-08

## 2024-11-08 DIAGNOSIS — H43.813: ICD-10-CM

## 2024-11-08 DIAGNOSIS — E11.3313: ICD-10-CM

## 2024-11-08 DIAGNOSIS — Z79.4: ICD-10-CM

## 2024-11-08 PROCEDURE — 92134 CPTRZ OPH DX IMG PST SGM RTA: CPT

## 2024-11-08 PROCEDURE — 67028 INJECTION EYE DRUG: CPT

## 2024-11-08 PROCEDURE — 92202 OPSCPY EXTND ON/MAC DRAW: CPT | Mod: 59

## 2024-11-08 PROCEDURE — 92014 COMPRE OPH EXAM EST PT 1/>: CPT | Mod: 25

## 2024-11-08 ASSESSMENT — TONOMETRY
OS_IOP_MMHG: 17
OD_IOP_MMHG: 15

## 2024-11-08 ASSESSMENT — VISUAL ACUITY
OS_CC: 20/25-2
OD_CC: 20/25+1

## 2025-01-24 ENCOUNTER — FOLLOW UP (OUTPATIENT)
Dept: URBAN - METROPOLITAN AREA CLINIC 50 | Facility: CLINIC | Age: 85
End: 2025-01-24

## 2025-01-24 DIAGNOSIS — H43.813: ICD-10-CM

## 2025-01-24 DIAGNOSIS — E11.3313: ICD-10-CM

## 2025-01-24 PROCEDURE — 92134 CPTRZ OPH DX IMG PST SGM RTA: CPT

## 2025-01-24 PROCEDURE — 92202 OPSCPY EXTND ON/MAC DRAW: CPT

## 2025-01-24 PROCEDURE — 92014 COMPRE OPH EXAM EST PT 1/>: CPT

## 2025-01-24 ASSESSMENT — TONOMETRY
OS_IOP_MMHG: 11
OD_IOP_MMHG: 10

## 2025-01-24 ASSESSMENT — VISUAL ACUITY
OD_CC: 20/25
OS_CC: 20/25

## 2025-03-28 ENCOUNTER — FOLLOW UP (OUTPATIENT)
Dept: URBAN - METROPOLITAN AREA CLINIC 50 | Facility: CLINIC | Age: 85
End: 2025-03-28

## 2025-03-28 DIAGNOSIS — E11.3313: ICD-10-CM

## 2025-03-28 DIAGNOSIS — H43.813: ICD-10-CM

## 2025-03-28 PROCEDURE — 92202 OPSCPY EXTND ON/MAC DRAW: CPT

## 2025-03-28 PROCEDURE — 92134 CPTRZ OPH DX IMG PST SGM RTA: CPT

## 2025-03-28 PROCEDURE — 92014 COMPRE OPH EXAM EST PT 1/>: CPT

## 2025-03-28 ASSESSMENT — VISUAL ACUITY
OD_CC: 20/30-2
OS_CC: 20/30-2

## 2025-03-28 ASSESSMENT — TONOMETRY
OS_IOP_MMHG: 16
OD_IOP_MMHG: 16

## 2025-08-29 ENCOUNTER — FOLLOW UP (OUTPATIENT)
Dept: URBAN - METROPOLITAN AREA CLINIC 50 | Facility: CLINIC | Age: 85
End: 2025-08-29

## 2025-08-29 DIAGNOSIS — H43.813: ICD-10-CM

## 2025-08-29 DIAGNOSIS — E11.3313: ICD-10-CM

## 2025-08-29 PROCEDURE — 92202 OPSCPY EXTND ON/MAC DRAW: CPT | Mod: 59

## 2025-08-29 PROCEDURE — 92134 CPTRZ OPH DX IMG PST SGM RTA: CPT

## 2025-08-29 PROCEDURE — 67028 INJECTION EYE DRUG: CPT

## 2025-08-29 PROCEDURE — 92014 COMPRE OPH EXAM EST PT 1/>: CPT | Mod: 25

## 2025-08-29 ASSESSMENT — VISUAL ACUITY
OS_CC: 20/25-2
OD_CC: 20/25-2

## 2025-08-29 ASSESSMENT — TONOMETRY
OS_IOP_MMHG: 13
OD_IOP_MMHG: 13

## (undated) DEVICE — TUBING SUCTION 10'X3/16" N510

## (undated) DEVICE — LUBRICANT INST KIT ENDO-LUBE 220-90

## (undated) DEVICE — LABEL MEDICATION SYSTEM 3303-P

## (undated) DEVICE — SOL NACL 0.9% IRRIG 1000ML BOTTLE 2F7124

## (undated) DEVICE — SYR 30ML SLIP TIP W/O NDL 302833

## (undated) DEVICE — KIT ENDO FIRST STEP DISINFECTANT 200ML W/POUCH EP-4

## (undated) DEVICE — ENDO VALVE SYR NDL KIT ULTRASOUND BRONCH NA-201SX-4022-A

## (undated) RX ORDER — ONDANSETRON 2 MG/ML
INJECTION INTRAMUSCULAR; INTRAVENOUS
Status: DISPENSED
Start: 2019-01-01

## (undated) RX ORDER — LIDOCAINE HYDROCHLORIDE 20 MG/ML
INJECTION, SOLUTION EPIDURAL; INFILTRATION; INTRACAUDAL; PERINEURAL
Status: DISPENSED
Start: 2019-01-01

## (undated) RX ORDER — PROPOFOL 10 MG/ML
INJECTION, EMULSION INTRAVENOUS
Status: DISPENSED
Start: 2019-01-01

## (undated) RX ORDER — GLYCOPYRROLATE 0.2 MG/ML
INJECTION, SOLUTION INTRAMUSCULAR; INTRAVENOUS
Status: DISPENSED
Start: 2019-01-01

## (undated) RX ORDER — FENTANYL CITRATE 50 UG/ML
INJECTION, SOLUTION INTRAMUSCULAR; INTRAVENOUS
Status: DISPENSED
Start: 2019-01-01